# Patient Record
Sex: FEMALE | Race: WHITE | URBAN - METROPOLITAN AREA
[De-identification: names, ages, dates, MRNs, and addresses within clinical notes are randomized per-mention and may not be internally consistent; named-entity substitution may affect disease eponyms.]

---

## 2019-05-22 ENCOUNTER — HOSPITAL ENCOUNTER (OUTPATIENT)
Dept: LAB | Age: 49
Discharge: HOME | End: 2019-05-22
Payer: COMMERCIAL

## 2019-05-22 ENCOUNTER — TELEPHONE (OUTPATIENT)
Dept: INTERNAL MEDICINE CLINIC | Age: 49
End: 2019-05-22

## 2019-05-22 ENCOUNTER — OFFICE VISIT (OUTPATIENT)
Dept: INTERNAL MEDICINE CLINIC | Age: 49
End: 2019-05-22

## 2019-05-22 VITALS
TEMPERATURE: 99.1 F | OXYGEN SATURATION: 99 % | RESPIRATION RATE: 12 BRPM | HEART RATE: 91 BPM | BODY MASS INDEX: 26.17 KG/M2 | HEIGHT: 62 IN | SYSTOLIC BLOOD PRESSURE: 146 MMHG | DIASTOLIC BLOOD PRESSURE: 80 MMHG | WEIGHT: 142.2 LBS

## 2019-05-22 DIAGNOSIS — J02.9 ACUTE PHARYNGITIS, UNSPECIFIED ETIOLOGY: ICD-10-CM

## 2019-05-22 DIAGNOSIS — J02.9 ACUTE PHARYNGITIS, UNSPECIFIED ETIOLOGY: Primary | ICD-10-CM

## 2019-05-22 DIAGNOSIS — J30.2 SEASONAL ALLERGIC RHINITIS, UNSPECIFIED TRIGGER: ICD-10-CM

## 2019-05-22 LAB
S PYO AG THROAT QL: NORMAL
VALID INTERNAL CONTROL?: YES

## 2019-05-22 PROCEDURE — 87070 CULTURE OTHR SPECIMN AEROBIC: CPT

## 2019-05-22 RX ORDER — FLUTICASONE PROPIONATE 50 MCG
1 SPRAY, SUSPENSION (ML) NASAL 2 TIMES DAILY
Qty: 1 BOTTLE | Refills: 1 | Status: SHIPPED | OUTPATIENT
Start: 2019-05-22

## 2019-05-22 NOTE — PROGRESS NOTES
ROOM # 11    Layman Course presents today for   Chief Complaint   Patient presents with    New Patient    Sore Throat     Pt complains of sore throat for the past three weeks       Layman Course preferred language for health care discussion is english/other. Is someone accompanying this pt? no    Is the patient using any DME equipment during [address]? no    Depression Screening:  3 most recent PHQ Screens 5/22/2019   Little interest or pleasure in doing things Not at all   Feeling down, depressed, irritable, or hopeless Not at all   Total Score PHQ 2 0       Learning Assessment:  Learning Assessment 5/22/2019   PRIMARY LEARNER Patient   HIGHEST LEVEL OF EDUCATION - PRIMARY LEARNER  2 YEARS OF COLLEGE   PRIMARY LANGUAGE ENGLISH   LEARNER PREFERENCE PRIMARY DEMONSTRATION   ANSWERED BY patient   RELATIONSHIP SELF       Abuse Screening:  Abuse Screening Questionnaire 5/22/2019   Do you ever feel afraid of your partner? N   Are you in a relationship with someone who physically or mentally threatens you? N   Is it safe for you to go home? Y       Fall Risk  Fall Risk Assessment, last 12 mths 5/22/2019   Able to walk? Yes   Fall in past 12 months? No       Health Maintenance reviewed and discussed per provider. Yes    Layman Course is due for   Health Maintenance Due   Topic Date Due    DTaP/Tdap/Td series (1 - Tdap) 11/30/1990    PAP AKA CERVICAL CYTOLOGY  11/30/1990         Please order/place referral if appropriate. Advance Directive:  1. Do you have an advance directive in place? Patient Reply: no    2. If not, would you like material regarding how to put one in place? Patient Reply: no    Coordination of Care:  1. Have you been to the ER, urgent care clinic since your last visit? Hospitalized since your last visit? no    2. Have you seen or consulted any other health care providers outside of the [address] [place] [address] since your last visit? Include any pap smears or colon screening.  no

## 2019-05-22 NOTE — TELEPHONE ENCOUNTER
Kami Brooks spoke with patient. Plan from visit reviewed (patient left after negative strep test came in). Flonase to be picked up today.

## 2019-05-22 NOTE — PROGRESS NOTES
HISTORY OF PRESENT ILLNESS  Pb Beach is a 51 y.o. female. HPI  Ms. Jennifer Parks presents as a new patient for 3 weeks of worsening sore throat. Sore throat is worse in the afternoons. She is doing salt water gargles and using honey which helps temporarily. She has upcoming chemotherapy on Friday and does not want this to delay it.   - Hx of allergies. No hx asthma. Taking Allegra prn. PMHx significant for breast cancer followed by Dr. Arin Isaac of [facility]. Diagnosed 12/2018. Upcoming left mastectomy 6/26/19. Undergoing chemo treatments - she is unsure of the names. Review of Systems   Constitutional: Negative for chills, fever and weight loss. HENT: Positive for sore throat. Negative for congestion and sinus pain. Respiratory: Positive for cough. Negative for sputum production, shortness of breath and wheezing. Gastrointestinal: Negative for nausea and vomiting. Visit Vitals  /80   Pulse 91   Temp 99.1 °F (37.3 °C) (Oral)   Resp 12   Ht 5' 2\" (1.575 m)   Wt 142 lb 3.2 oz (64.5 kg)   SpO2 99%   BMI 26.01 kg/m²       Physical Exam   Constitutional: She is oriented to person, place, and time. She appears well-developed and well-nourished. No distress. HENT:   Head: Normocephalic and atraumatic. Right Ear: Tympanic membrane, external ear and ear canal normal.   Left Ear: Tympanic membrane, external ear and ear canal normal.   Nose: Mucosal edema (Turbinates pale and edematous.) present. Mouth/Throat: Uvula is midline and mucous membranes are normal. No oral lesions. Posterior oropharyngeal erythema (Minimal posterior pharyngeal erythema.) present. No oropharyngeal exudate or posterior oropharyngeal edema. Tonsils surgically absent. No white patches or throat lesions. Eyes: Pupils are equal, round, and reactive to light. Conjunctivae are normal. No scleral icterus. Neck: Neck supple. Cardiovascular: Normal rate, regular rhythm and normal heart sounds.  Exam reveals no gallop. No murmur heard. Pulses:       Dorsalis pedis pulses are 2+ on the right side, and 2+ on the left side. Posterior tibial pulses are 2+ on the right side, and 2+ on the left side. No pedal edema. Pulmonary/Chest: Effort normal and breath sounds normal. No respiratory distress. She has no decreased breath sounds. She has no wheezes. She has no rhonchi. She has no rales. Lymphadenopathy:        Head (right side): No submandibular and no tonsillar adenopathy present. Head (left side): No submandibular and no tonsillar adenopathy present. She has no cervical adenopathy. Right: No supraclavicular adenopathy present. Left: No supraclavicular adenopathy present. Neurological: She is alert and oriented to person, place, and time. Skin: Skin is warm and dry. Psychiatric: She has a normal mood and affect. Her speech is normal.     Results for orders placed or performed in visit on 05/22/19   AMB POC RAPID STREP A   Result Value Ref Range    VALID INTERNAL CONTROL POC Yes     Group A Strep Ag Neg-culture sent Negative       ASSESSMENT and PLAN  Diagnoses and all orders for this visit:    1. Acute pharyngitis, unspecified etiology  -     AMB POC RAPID STREP A  -     THROAT CULTURE; Future  - Negative exam and POC strep. Will send out culture. - I suspect this is more related to post-nasal drip from allergies. See below. - Can take OTC Ibuprofen prn sore throat. 2. Seasonal allergic rhinitis, unspecified trigger  -     fluticasone propionate (FLONASE) 50 mcg/actuation nasal spray; 1 [place] by Both Nostrils route two (2) times a day. - Take Allegra daily rather than prn. Patient was discharged with strep result while I was in with another patient, so she was called for final plan to be discussed.

## 2019-05-24 LAB
BACTERIA SPEC CULT: NORMAL
BACTERIA SPEC CULT: NORMAL
SERVICE CMNT-IMP: NORMAL